# Patient Record
(demographics unavailable — no encounter records)

---

## 2025-07-03 NOTE — HISTORY OF PRESENT ILLNESS
[FreeTextEntry1] : Reason for consult: MS   HPI: UDAY BORGES is a 24-year-old man referred by Dr Frankel   6/2024: Developed L sided tickling sensation around the neck, sensation was constant initially then became less frequent and currently worse/triggered when lying flat on his tummy or with position change. No associated weakness or numbness.  4/2025: Started PT for 8 weeks for persistence of symptoms, thought likely related to pinched nerve. However, 6 weeks into PT, he developed worsening of symptoms with dizziness and room spinning sensation, and unsteady gait. Felt pulsating sensation affecting RUE. Saw Dr Saqib frankel who ordered brain MRI showing lesions affecting brain and C spine.   ~3 weeks ago, developed b/l LE LE weakness associated with urinary urgency, currently improved except for vibration sensation in both limbs L>R    ROS/Current Sx: Blurry vision affecting both eyes L>R No BB symptoms No weakness/ tingling or numbness Neck ticklish sensation with neck movement     PMHX: HLD   MEDS: Vitamin D 10k daily Mg citrate   ALL: NKDA   SHx: No tob, etoh, or drugs   FHx: DM2/ fibromyalgia (mother) PD (grandfather)   Vitals: Reviewed   Exam: AO3.  Normally conversant.  Follows commands, names, and repeats.  Good attention.   PERRL, VFF, EOMI, no nystagmus, face symmetric, TUP at midline.   Motor:                                                R:                               L: Del                                          5                               5 Bi                                            5                               5 Tri                                           5                               5 Wrist Extensors                      5                               5 Finger abductors                    5                               5                                         5                               5   HF                                           5                               5 KE                                           5                               5 KF                                           5                               5 DF                                           5-                              5 PF                                           5                               5   Tone                                R                               L UE                                   0                               0 LE                                   0                                0   Sensory                 RUE/ LUE                  RLE/ LLE     LT                           +/ +                                  +/+ Vib                          +/+                                   +/+ JPS                        +/ +                                  +/ +                   PP                          +/+                                   +/+ Temp                      +/+                                   +/+   Reflexes:                          R                             L                            Biceps              2                             2 BR                    2                             2 Triceps             2                             2 Pat                   2                             3 AJ                    2                             4+ (1-2 beats of non-sustained clonus LLE)   TOES              F                            F     Coordination:                        R                             L                       FTN                0                            0 SLIM               0                            0 HTS                0                            0       Gait: Normal gait able to tandem, walks on heels, and tiptoes.  MR Brain 5/2025 w/o rashard ([facility]): Multiple T2/Flair signal abnormality affecting PV area with Cabral's finger appearance. Infratentorial lesions affecting R lateral medulla and R cerebellum.  MR C spine 5/2025 w/o rashard ([facility]): STIR and T2 axial lesion seen at C2-c3.   AP: 24 with multiple clinical relapses since 6/2024 (Sensory symptoms L neck, brain stem syndrome, recent b/l sensorimotor symptoms LE) with brain MRI showing multiple T2/Flair signal abnormality affecting PV area with Cabral's finger appearance and infratentorial lesions affecting R lateral medulla and R cerebellum. MR C spine with STIR and T2 axial lesion seen at C2-C3. Overall, meets MS criteria with DIT and DIS. Given active disease, moderate disease burden and patient's risk (Race, Infratentorial/spinal cord lesions, partial recovery with subtle residual weakness DF R) plan to proceed with HET with B cell depleting therapy.  all questions answered, education provided, management discussed at length.  DMT pre-initiation labs Continue Vitamin D 10K IU daily Repeat MRI brain and C spine 3 months on DMT-Will get T spine MRI then RTC 3-4 weeks for DMT discussion

## 2025-07-03 NOTE — REASON FOR VISIT
[Initial Evaluation] : an initial evaluation [Consultation] : a consultation visit [FreeTextEntry1] : MS

## 2025-07-03 NOTE — END OF VISIT
[] : Fellow [FreeTextEntry3] : Clinical hx and exam and MRI all compatible with RRMS. several relapses over a short span. recommended DMT. rtc 2-3wks to discuss options. check labs. [Time Spent: ___ minutes] : I have spent [unfilled] minutes of time on the encounter which excludes teaching and separately reported services.

## 2025-07-17 NOTE — END OF VISIT
[] : Fellow [FreeTextEntry3] : given multiple relapses in a short period as well as substantial lesion burden, spinal cord involvement, and male gender, high efficacy therapy is indicated. he is jcv+ so the best option would be one of the b cell therapies. he will let us know his decision soon. rtc 3m sooner prn. [Time Spent: ___ minutes] : I have spent [unfilled] minutes of time on the encounter which excludes teaching and separately reported services.

## 2025-07-17 NOTE — HISTORY OF PRESENT ILLNESS
[FreeTextEntry1] : Reason for consult: MS   HPI: UDAY BORGES is a 24-year-old man referred by Dr Frankel   6/2024: Developed L sided tickling sensation around the neck, sensation was constant initially then became less frequent and currently worse/triggered when lying flat on his tummy or with position change. No associated weakness or numbness.  4/2025: Started PT for 8 weeks for persistence of symptoms, thought likely related to pinched nerve. However, 6 weeks into PT, he developed worsening of symptoms with dizziness and room spinning sensation, and unsteady gait. Felt pulsating sensation affecting RUE. Saw Dr Saqib frankel who ordered brain MRI showing lesions affecting brain and C spine.   ~3 weeks ago, developed b/l LE LE weakness associated with urinary urgency, currently improved except for vibration sensation in both limbs L>R    ROS/Current Sx:  Blurry vision affecting both eyes L>R No BB symptoms No weakness/ tingling or numbness Neck ticklish sensation with neck movement     PMHX: HLD   MEDS: Vitamin D 10k daily Mg citrate   ALL: NKDA   SHx: No tob, etoh, or drugs   FHx: DM2/ fibromyalgia (mother) PD (grandfather)   Vitals: Reviewed   Exam: AO3.  Normally conversant.  Follows commands, names, and repeats.  Good attention.   PERRL, VFF, EOMI, no nystagmus, face symmetric, TUP at midline.   Motor:                                                R:                               L: Del                                          5                               5 Bi                                            5                               5 Tri                                           5                               5 Wrist Extensors                      5                               5 Finger abductors                    5                               5                                         5                               5   HF                                           5                               5 KE                                           5                               5 KF                                           5                               5 DF                                           5                               5 PF                                           5                               5   Tone                                R                               L UE                                   0                               0 LE                                   0                                0   Sensory                 RUE/ LUE                  RLE/ LLE     LT                           +/ +                                  +/+ Vib                          +/+                                   +/+ JPS                        +/ +                                  +/ +                   PP                          +/+                                   +/+ Temp                      +/+                                   +/+   Reflexes:                          R                             L                            Biceps              2                             2 BR                    2                             2 Triceps             2                             2 Pat                   2                             3 AJ                    2                             2   TOES              F                            F     Coordination:                        R                             L                       FTN                0                            0 SLIM               0                            0 HTS                0                            0       Gait: Normal gait able to tandem, walks on heels, and tiptoes.  MR Brain 5/2025 w/o rashard ([facility]): Multiple T2/Flair signal abnormality affecting PV area with Cabral's finger appearance. Infratentorial lesions affecting R lateral medulla and R cerebellum.  MR C spine 5/2025 w/o rashard ([facility]): STIR and T2 axial lesion seen at C2-c3.   AP: 24 with multiple clinical relapses since 6/2024 (Sensory symptoms L neck, brain stem syndrome, recent b/l sensorimotor symptoms LE) with brain MRI showing multiple T2/Flair signal abnormality affecting PV area with Cabral's finger appearance and infratentorial lesions affecting R lateral medulla and R cerebellum. MR C spine with STIR and T2 axial lesion seen at C2-C3. Overall, meets MS criteria with DIT and DIS. Given active disease, moderate disease burden and patient's risk (Infratentorial/spinal cord lesions, multiple relapses in past year...) discussed HET with B cell depleting therapy.  all questions answered, education provided, management discussed at length.  Discussed full benefit risk profile of including risk of infusion/injection reactions, infectious, rare serious infections, PML, possible neoplastic risk, possible effects on vaccine response, blood effects, as each risk pertains to each medication.  Patient to let us know his decision about DMT choice - briumvi, ocrevus, or kesimpta. Continue Vitamin D 10K IU daily, but recheck in 2-3m Repeat MRI brain and C spine 3 months on DMT-Will get T spine MRI then RTC 3months, after starting DMT